# Patient Record
Sex: FEMALE | Race: AMERICAN INDIAN OR ALASKA NATIVE
[De-identification: names, ages, dates, MRNs, and addresses within clinical notes are randomized per-mention and may not be internally consistent; named-entity substitution may affect disease eponyms.]

---

## 2017-06-12 ENCOUNTER — HOSPITAL ENCOUNTER (EMERGENCY)
Dept: HOSPITAL 5 - ED | Age: 32
Discharge: HOME | End: 2017-06-12
Payer: MEDICAID

## 2017-06-12 VITALS — SYSTOLIC BLOOD PRESSURE: 122 MMHG | DIASTOLIC BLOOD PRESSURE: 52 MMHG

## 2017-06-12 DIAGNOSIS — Z3A.01: ICD-10-CM

## 2017-06-12 DIAGNOSIS — O23.41: Primary | ICD-10-CM

## 2017-06-12 LAB
ALBUMIN SERPL-MCNC: 4 G/DL (ref 3.9–5)
ALBUMIN/GLOB SERPL: 1.2 %
ALP SERPL-CCNC: 35 UNITS/L (ref 35–129)
ALT SERPL-CCNC: 9 UNITS/L (ref 7–56)
ANION GAP SERPL CALC-SCNC: 26 MMOL/L
BACTERIA #/AREA URNS HPF: (no result) /HPF
BASOPHILS NFR BLD AUTO: 0.9 % (ref 0–1.8)
BILIRUB SERPL-MCNC: 0.5 MG/DL (ref 0.1–1.2)
BILIRUB UR QL STRIP: (no result)
BLOOD UR QL VISUAL: (no result)
BUN SERPL-MCNC: 6 MG/DL (ref 7–17)
BUN/CREAT SERPL: 12 %
CALCIUM SERPL-MCNC: 9 MG/DL (ref 8.4–10.2)
CHLORIDE SERPL-SCNC: 97.8 MMOL/L (ref 98–107)
CK SERPL-CCNC: 47 UNITS/L (ref 30–135)
CO2 SERPL-SCNC: 17 MMOL/L (ref 22–30)
EOSINOPHIL NFR BLD AUTO: 0.2 % (ref 0–4.3)
GLUCOSE SERPL-MCNC: 113 MG/DL (ref 65–100)
HCT VFR BLD CALC: 39.7 % (ref 30.3–42.9)
HGB BLD-MCNC: 12.8 GM/DL (ref 10.1–14.3)
KETONES UR STRIP-MCNC: 20 MG/DL
LEUKOCYTE ESTERASE UR QL STRIP: (no result)
LIPASE SERPL-CCNC: 19 UNITS/L (ref 13–60)
MAGNESIUM SERPL-MCNC: 2.2 MG/DL (ref 1.7–2.3)
MCH RBC QN AUTO: 27 PG (ref 28–32)
MCHC RBC AUTO-ENTMCNC: 32 % (ref 30–34)
MCV RBC AUTO: 83 FL (ref 79–97)
NITRITE UR QL STRIP: (no result)
PH UR STRIP: 8 [PH] (ref 5–7)
PLATELET # BLD: 242 K/MM3 (ref 140–440)
POTASSIUM SERPL-SCNC: 3.2 MMOL/L (ref 3.6–5)
PROT SERPL-MCNC: 7.3 G/DL (ref 6.3–8.2)
PROT UR STRIP-MCNC: (no result) MG/DL
RBC # BLD AUTO: 4.79 M/MM3 (ref 3.65–5.03)
RBC #/AREA URNS HPF: 16 /HPF (ref 0–6)
SODIUM SERPL-SCNC: 138 MMOL/L (ref 137–145)
URINE DRUGS OF ABUSE NOTE: (no result)
UROBILINOGEN UR-MCNC: < 2 MG/DL (ref ?–2)
WBC # BLD AUTO: 9.6 K/MM3 (ref 4.5–11)
WBC #/AREA URNS HPF: 150 /HPF (ref 0–6)

## 2017-06-12 PROCEDURE — 82550 ASSAY OF CK (CPK): CPT

## 2017-06-12 PROCEDURE — 86900 BLOOD TYPING SEROLOGIC ABO: CPT

## 2017-06-12 PROCEDURE — 86901 BLOOD TYPING SEROLOGIC RH(D): CPT

## 2017-06-12 PROCEDURE — 84702 CHORIONIC GONADOTROPIN TEST: CPT

## 2017-06-12 PROCEDURE — 96374 THER/PROPH/DIAG INJ IV PUSH: CPT

## 2017-06-12 PROCEDURE — 96361 HYDRATE IV INFUSION ADD-ON: CPT

## 2017-06-12 PROCEDURE — 36415 COLL VENOUS BLD VENIPUNCTURE: CPT

## 2017-06-12 PROCEDURE — G0480 DRUG TEST DEF 1-7 CLASSES: HCPCS

## 2017-06-12 PROCEDURE — 76817 TRANSVAGINAL US OBSTETRIC: CPT

## 2017-06-12 PROCEDURE — 80053 COMPREHEN METABOLIC PANEL: CPT

## 2017-06-12 PROCEDURE — 86850 RBC ANTIBODY SCREEN: CPT

## 2017-06-12 PROCEDURE — 81001 URINALYSIS AUTO W/SCOPE: CPT

## 2017-06-12 PROCEDURE — 80307 DRUG TEST PRSMV CHEM ANLYZR: CPT

## 2017-06-12 PROCEDURE — 99284 EMERGENCY DEPT VISIT MOD MDM: CPT

## 2017-06-12 PROCEDURE — 76801 OB US < 14 WKS SINGLE FETUS: CPT

## 2017-06-12 PROCEDURE — 83735 ASSAY OF MAGNESIUM: CPT

## 2017-06-12 PROCEDURE — 85025 COMPLETE CBC W/AUTO DIFF WBC: CPT

## 2017-06-12 PROCEDURE — 93005 ELECTROCARDIOGRAM TRACING: CPT

## 2017-06-12 PROCEDURE — 83690 ASSAY OF LIPASE: CPT

## 2017-06-12 PROCEDURE — 80320 DRUG SCREEN QUANTALCOHOLS: CPT

## 2017-06-12 PROCEDURE — 96375 TX/PRO/DX INJ NEW DRUG ADDON: CPT

## 2017-06-12 PROCEDURE — 93010 ELECTROCARDIOGRAM REPORT: CPT

## 2017-06-12 RX ADMIN — POTASSIUM CHLORIDE SCH MLS/HR: 10 INJECTION, SOLUTION INTRAVENOUS at 20:20

## 2017-06-12 RX ADMIN — POTASSIUM CHLORIDE SCH: 10 INJECTION, SOLUTION INTRAVENOUS at 21:56

## 2017-06-12 NOTE — EMERGENCY DEPARTMENT REPORT
ED General Adult HPI





- General


Chief complaint: Nausea/Vomiting/Diarrhea


Stated complaint: NAUSEA/VOMITING/SEIZURES


Time Seen by Provider: 06/12/17 16:10


Source: patient, RN notes reviewed, old records reviewed


Mode of arrival: Wheelchair


Limitations: No Limitations





- History of Present Illness


Initial comments: 


This is a 31-year-old female.  She is previously known to have a history of 

bipolar disorder and seizure disorder.





Patient indicated that she is 6 weeks pregnant, and has a history of seizures, 

and gestational diabetes.  Surgical history significant for right-sided 

salpingectomy.





As per verbal report from family to nursing staff, patient's seizures have been 

present since childhood, and that they are "stress induced."








The patient presents to the Hospital today complaining of nausea and vomiting.  

When I speak to the patient, she answers very softly, and only gives 1 or 2 

word answers.  The patient indicates that she is not having homicidal and 

suicidal thoughts, but she is not having abdominal pain.  However, the history 

is very limited, because the patient really does not answer open-ended 

questions.  She does follow some commands.





Nursing staff indicates that when they elevate the patient's upper extremity, 

the patient stops before it hits the stretcher.  The patient is unable to 

describe exacerbating or relieving factors.








Family is not currently available at this time for collateral information.








I attempted to contact the listed phone numbers to obtain collateral information

, and was informed that they were incorrect numbers.









































-: unknown


Radiation: other (per hpi)


Quality: other (per hpi)


Improves with: other (per hpi)


Worsens with: other (per hpi)


Associated Symptoms: other (per hpi)





- Related Data


 Previous Rx's











 Medication  Instructions  Recorded  Last Taken  Type


 


Doxylamine/Pyridoxine HCl 1 each PO QHS PRN #30 tablet.dr 06/12/17 Unknown Rx





[Diclegis Dr 10-10 mg Tablet]    


 


Francia Root [Francia] 250 mg PO QID PRN #30 capsule 06/12/17 Unknown Rx


 


Nitrofurantoin Mono/M-Cryst 100 mg PO Q12HR #13 capsule 06/12/17 Unknown Rx





[Macrobid CAP]    


 


Ondansetron [Zofran Odt] 4 mg PO QID PRN #20 tab.rapdis 06/12/17 Unknown Rx


 


Potassium Chloride [K-Dur] 20 meq PO BID #14 tab 06/12/17 Unknown Rx


 


Prenatal Vit W-Ca,Fe,FA(<1 mg) 1 each PO QDAY #30 tablet 06/12/17 Unknown Rx





[Prenatal Vitamins]    











 Allergies











Allergy/AdvReac Type Severity Reaction Status Date / Time


 


Penicillins Allergy Severe SOB. Verified 02/26/15 18:32





   THROAT  





   SWELLS  














ED Review of Systems


ROS: 


Stated complaint: NAUSEA/VOMITING/SEIZURES


Other details as noted in HPI





Constitutional: malaise.  denies: fever


Eyes: denies: vision change


ENT: denies: epistaxis


Respiratory: denies: cough


Cardiovascular: denies: chest pain


Gastrointestinal: nausea, vomiting


Genitourinary: as per HPI.  denies: dysuria


Musculoskeletal: myalgia


Skin: denies: lesions


Neurological: weakness


Psychiatric: denies: homicidal thoughts, suicidal thoughts





ED Past Medical Hx





- Past Medical History


Previous Medical History?: Yes


Hx Congestive Heart Failure: No


Hx Diabetes: Yes (GDM with last pregnancy)


Hx Seizures: Yes (since childhood. None over last year. stress induced.)


Hx Psychiatric Treatment: Yes (BIPOLAR)


Hx Asthma: No


Hx COPD: No





- Surgical History


Past Surgical History?: No





- Social History


Smoking Status: Never Smoker


Substance Use Type: None





- Medications


Home Medications: 


 Home Medications











 Medication  Instructions  Recorded  Confirmed  Last Taken  Type


 


Doxylamine/Pyridoxine HCl 1 each PO QHS PRN #30 tablet. 06/12/17  Unknown Rx





[Diclegis Dr 10-10 mg Tablet]     


 


Francia Root [Francia] 250 mg PO QID PRN #30 capsule 06/12/17  Unknown Rx


 


Nitrofurantoin Mono/M-Cryst 100 mg PO Q12HR #13 capsule 06/12/17  Unknown Rx





[Macrobid CAP]     


 


Ondansetron [Zofran Odt] 4 mg PO QID PRN #20 tab.rapdis 06/12/17  Unknown Rx


 


Potassium Chloride [K-Dur] 20 meq PO BID #14 tab 06/12/17  Unknown Rx


 


Prenatal Vit W-Ca,Fe,FA(<1 mg) 1 each PO QDAY #30 tablet 06/12/17  Unknown Rx





[Prenatal Vitamins]     














ED Physical Exam





- General


Limitations: Physical Limitation


General appearance: anxious, other (patient is awake.  She answers some "yes/no

" questions.)





- Head


Head exam: Present: atraumatic, normocephalic





- Eye


Eye exam: Present: normal appearance, EOMI





- ENT


ENT exam: Present: normal exam, normal orophraynx, mucous membranes dry





- Neck


Neck exam: Present: normal inspection, full ROM.  Absent: tenderness, 

meningismus





- Respiratory


Respiratory exam: Present: normal lung sounds bilaterally.  Absent: respiratory 

distress, wheezes, rales, rhonchi, stridor, chest wall tenderness, accessory 

muscle use, decreased breath sounds, prolonged expiratory





- Cardiovascular


Cardiovascular Exam: Present: tachycardia, normal heart sounds.  Absent: 

systolic murmur, diastolic murmur, rubs, gallop





- GI/Abdominal


GI/Abdominal exam: Present: soft, normal bowel sounds.  Absent: distended, 

tenderness, guarding, rebound, rigid, pulsatile mass





- Extremities Exam


Extremities exam: Present: normal inspection, full ROM, normal capillary 

refill.  Absent: tenderness, pedal edema, joint swelling, calf tenderness





- Back Exam


Back exam: Present: normal inspection, full ROM.  Absent: tenderness, CVA 

tenderness (R), CVA tenderness (L), muscle spasm, paraspinal tenderness, 

vertebral tenderness





- Neurological Exam


Neurological exam: Present: alert (there is no facial droop.  The tongue is 

midline.  Extraocular movements are intact.), other (patient moves 4 

extremities to command, but quite weak week.  She indicates that she can 

perceive sensation in 4 extremities.)





- Psychiatric


Psychiatric exam: Absent: homicidal ideation, suicidal ideation





- Skin


Skin exam: Present: warm, dry, intact, normal color.  Absent: rash





ED Course


 Vital Signs











  06/12/17 06/12/17 06/12/17





  16:11 17:05 17:10


 


Temperature 97.8 F  


 


Pulse Rate 103 H  


 


Respiratory 16  





Rate   


 


Blood Pressure 123/79  111/66


 


Blood Pressure   





[Left]   


 


O2 Sat by Pulse 100 100 100





Oximetry   














  06/12/17 06/12/17 06/12/17





  17:20 17:30 17:40


 


Temperature   


 


Pulse Rate   


 


Respiratory   





Rate   


 


Blood Pressure 111/60 117/67 117/67


 


Blood Pressure   





[Left]   


 


O2 Sat by Pulse 100 100 100





Oximetry   














  06/12/17 06/12/17 06/12/17





  17:50 18:00 18:10


 


Temperature   


 


Pulse Rate   


 


Respiratory   





Rate   


 


Blood Pressure 115/82 112/52 112/52


 


Blood Pressure   





[Left]   


 


O2 Sat by Pulse 85 100 99





Oximetry   














  06/12/17 06/12/17 06/12/17





  18:15 18:20 18:30


 


Temperature 98.4 F  


 


Pulse Rate 72  


 


Respiratory 16  





Rate   


 


Blood Pressure  106/63 105/63


 


Blood Pressure 105/63  





[Left]   


 


O2 Sat by Pulse 100 100 





Oximetry   














  06/12/17 06/12/17 06/12/17





  19:05 19:10 19:58


 


Temperature   


 


Pulse Rate   


 


Respiratory 16  





Rate   


 


Blood Pressure  98/64 99/60


 


Blood Pressure   





[Left]   


 


O2 Sat by Pulse 100 100 100





Oximetry   














  06/12/17 06/12/17





  20:00 21:57


 


Temperature  98.7 F


 


Pulse Rate  92 H


 


Respiratory  18





Rate  


 


Blood Pressure 103/66 


 


Blood Pressure  122/52





[Left]  


 


O2 Sat by Pulse 99 99





Oximetry  














- Reevaluation(s)


Reevaluation #1: 





06/12/17 17:24 differential diagnosis: Conversion disorder, bipolar disorder, 

urinary tract infection, dehydration, hyperemesis, nausea and vomiting of 

pregnancy, partial seizure, psychogenic nonepileptic form seizure














Assessment and plan: 31-year-old female with a presenting complaint of nausea 

and vomiting.  When I speak to the patient, she indicates that she feels weak 

and that her arms and legs are hurting her.  There appears to be a psychiatric 

component to this presentation.  At points during her interview, she does 

follow her eyes up, but she remains awake the entire time.  We will check basic 

laboratory studies, obtain EKG, an obstetrics ultrasound.  Her neurologic exam 

is nonfocal, I don't believe she requires advanced imaging of her brain at this 

time.

















Reevaluation #2: 





06/12/17 18:17 patient much more awake and conversant.  She indicates she is 

not taking any medications at this time.  She indicates her 's phone 

number is the following; [phone].  Laboratory studies pending.  Urinalysis 

pending.  Ultrasound pending.











Reevaluation #3: 





06/12/17 20:03 no active vomiting noted.  Patient is able to walk without 

difficulty.  Appears quite comfortable.  Currently waiting laboratory studies, 

urinalysis, ultrasound.











Reevaluation #4: 





06/12/17 21:09 patient is reassessed.  Urinalysis is suggestive of urinary 

tract infection.  Ultrasound suggests intrauterine pregnancy.





Patient is tolerating liquid feeds.  I have reassessed this patient multiple 

times.  Her mental status and affect  had improved dramatically since she's 

been here.  The patient is instructed to discontinue cannabis consumption if 

she is doing so.  She will be discharged with nausea medication, prenatal 

vitamins, Macrobid, potassium supplementation.  Return precautions are 

reviewed.  The patient does admit to consuming marijuana, she indicates that 

she last consumed one week ago.








The patient was counseled that consumption of marijuana could have adverse 

effects on herself and on her pregnancy.

















06/12/17 21:16








ED Medical Decision Making





- Lab Data


Result diagrams: 


 06/12/17 16:36





 06/12/17 16:36








 Vital Signs











  06/12/17





  16:11


 


Temperature 97.8 F


 


Pulse Rate 103 H


 


Respiratory 16





Rate 


 


Blood Pressure 123/79


 


O2 Sat by Pulse 100





Oximetry 














 Lab Results











  06/12/17 06/12/17 06/12/17 Range/Units





  16:36 16:36 16:36 


 


WBC  9.6    (4.5-11.0)  K/mm3


 


RBC  4.79    (3.65-5.03)  M/mm3


 


Hgb  12.8    (10.1-14.3)  gm/dl


 


Hct  39.7    (30.3-42.9)  %


 


MCV  83    (79-97)  fl


 


MCH  27 L    (28-32)  pg


 


MCHC  32    (30-34)  %


 


RDW  13.7    (13.2-15.2)  %


 


Plt Count  242    (140-440)  K/mm3


 


Lymph % (Auto)  17.8    (13.4-35.0)  %


 


Mono % (Auto)  3.8    (0.0-7.3)  %


 


Eos % (Auto)  0.2    (0.0-4.3)  %


 


Baso % (Auto)  0.9    (0.0-1.8)  %


 


Lymph #  1.7    (1.2-5.4)  K/mm3


 


Mono #  0.4    (0.0-0.8)  K/mm3


 


Eos #  0.0    (0.0-0.4)  K/mm3


 


Baso #  0.1    (0.0-0.1)  K/mm3


 


Seg Neutrophils %  77.3 H    (40.0-70.0)  %


 


Seg Neutrophils #  7.4    (1.8-7.7)  K/mm3


 


Sodium   138   (137-145)  mmol/L


 


Potassium   3.2 L   (3.6-5.0)  mmol/L


 


Chloride   97.8 L   ()  mmol/L


 


Carbon Dioxide   17 L   (22-30)  mmol/L


 


Anion Gap   26   mmol/L


 


BUN   6 L   (7-17)  mg/dL


 


Creatinine   0.5 L   (0.7-1.2)  mg/dL


 


Estimated GFR   > 60   ml/min


 


BUN/Creatinine Ratio   12.00   %


 


Glucose   113 H   ()  mg/dL


 


Calcium   9.0   (8.4-10.2)  mg/dL


 


Total Bilirubin   0.50   (0.1-1.2)  mg/dL


 


AST   13   (5-40)  units/L


 


ALT   9   (7-56)  units/L


 


Alkaline Phosphatase   35   ()  units/L


 


Total Protein   7.3   (6.3-8.2)  g/dL


 


Albumin   4.0   (3.9-5)  g/dL


 


Albumin/Globulin Ratio   1.2   %


 


Lipase   19   (13-60)  units/L


 


HCG, Quant    10005 H  (0-4)  mIU/mL


 


Blood Type     


 


Antibody Screen     














  06/12/17 Range/Units





  17:40 


 


WBC   (4.5-11.0)  K/mm3


 


RBC   (3.65-5.03)  M/mm3


 


Hgb   (10.1-14.3)  gm/dl


 


Hct   (30.3-42.9)  %


 


MCV   (79-97)  fl


 


MCH   (28-32)  pg


 


MCHC   (30-34)  %


 


RDW   (13.2-15.2)  %


 


Plt Count   (140-440)  K/mm3


 


Lymph % (Auto)   (13.4-35.0)  %


 


Mono % (Auto)   (0.0-7.3)  %


 


Eos % (Auto)   (0.0-4.3)  %


 


Baso % (Auto)   (0.0-1.8)  %


 


Lymph #   (1.2-5.4)  K/mm3


 


Mono #   (0.0-0.8)  K/mm3


 


Eos #   (0.0-0.4)  K/mm3


 


Baso #   (0.0-0.1)  K/mm3


 


Seg Neutrophils %   (40.0-70.0)  %


 


Seg Neutrophils #   (1.8-7.7)  K/mm3


 


Sodium   (137-145)  mmol/L


 


Potassium   (3.6-5.0)  mmol/L


 


Chloride   ()  mmol/L


 


Carbon Dioxide   (22-30)  mmol/L


 


Anion Gap   mmol/L


 


BUN   (7-17)  mg/dL


 


Creatinine   (0.7-1.2)  mg/dL


 


Estimated GFR   ml/min


 


BUN/Creatinine Ratio   %


 


Glucose   ()  mg/dL


 


Calcium   (8.4-10.2)  mg/dL


 


Total Bilirubin   (0.1-1.2)  mg/dL


 


AST   (5-40)  units/L


 


ALT   (7-56)  units/L


 


Alkaline Phosphatase   ()  units/L


 


Total Protein   (6.3-8.2)  g/dL


 


Albumin   (3.9-5)  g/dL


 


Albumin/Globulin Ratio   %


 


Lipase   (13-60)  units/L


 


HCG, Quant   (0-4)  mIU/mL


 


Blood Type  O POSITIVE  


 


Antibody Screen  TNR  

















- EKG Data


-: EKG Interpreted by Me


EKG shows normal: sinus rhythm


Rate: normal, tachycardia, bradycardia





- EKG Data


When compared to previous EKG there are: previous EKG unavailable





06/12/17 18:18 normal sinus, 75 bpm, normal intervals, normal axis, motion 

artifact, incomplete right bundle branch block, not morphologically consistent 

with STEMI




















- Radiology Data


Radiology results: report reviewed, image reviewed


Ultrasound demonstrated single live intrauterine gestation at 6 weeks and 5 

days.  No obvious bleeding is noted.  Fetal cardiac activity is noted.

















Critical care attestation.: 


If time is entered above; I have spent that time in minutes in the direct care 

of this critically ill patient, excluding procedure time.








ED Disposition


Clinical Impression: 


 UTI (urinary tract infection), Nausea and vomiting during pregnancy





Disposition: DC-01 TO HOME OR SELFCARE


Is pt being admited?: No


Does the pt Need Aspirin: No


Condition: Stable


Instructions:  Hyperemesis Gravidarum (ED)


Additional Instructions: 


Take the medications as directed.  Follow up with an OB/GYN doctor within the 

next 7-10 days.  Discontinue marijuana consumption if you are consuming this 

product.  This is not healthy for yourself for further pregnancy.  Diet should 

be advanced as tolerated.  Return to the ER right away with fevers, chills, 

intractable nausea or vomiting, change in mental status, confusion, inability 

to tolerate liquid feeds.


Prescriptions: 


Doxylamine/Pyridoxine HCl [Diclegis Dr 10-10 mg Tablet] 1 each PO QHS PRN #30 

tablet.


 PRN Reason: Nausea


Francia Root [Francia] 250 mg PO QID PRN #30 capsule


 PRN Reason: Nausea


Nitrofurantoin Mono/M-Cryst [Macrobid CAP] 100 mg PO Q12HR #13 capsule


Ondansetron [Zofran Odt] 4 mg PO QID PRN #20 tab.rapdis


 PRN Reason: Nausea


Potassium Chloride [K-Dur] 20 meq PO BID #14 tab


Prenatal Vit W-Ca,Fe,FA(<1 mg) [Prenatal Vitamins] 1 each PO QDAY #30 tablet


Referrals: 


[facility],MD [Primary Care Provider] - 3-5 Days


[facility] [Provider Group] - 3-5 Days

## 2017-06-12 NOTE — EMERGENCY DEPARTMENT REPORT
Chief Complaint: Nausea/Vomiting/Diarrhea


Stated Complaint: NAUSEA/VOMITING/SEIZURES


Time Seen by Provider: 06/12/17 16:10





- HPI


History of Present Illness: 





PT is 6 weeks pregnant.  PT has been vomiting x 1 week.  PT has seen ob/gyn for 

this pregnancy.  PT has a hx of epilepsy.  PT states sz medication "don't work"

  





- ROS


Review of Systems: 





- sz activity


+ dark urine


- dysuria 


+ n/v 





- Exam


Physical Exam: 





pt appears nauseated


PT alert and appropriate.  no sz activity noted.  


MSE screening note: 


Focused history and physical exam performed.


Due to findings the following was ordered:





labs, fluids 





ED Disposition for MSE


Condition: Stable

## 2017-06-12 NOTE — ULTRASOUND REPORT
FINAL REPORT



EXAM:  US OB TRANSVAGINAL



HISTORY:  pregnant n/v 



TECHNIQUE:  Ultrasound obstetrical transabdominal 



PRIORS:  None.



FINDINGS:  

Uterus measures 9.2 x 6.0 x 7.4 centimeters 



There is gestational sac present within the uterus 



Fetal pole identified in with crown-rump length and 0.8

centimeters corresponding to an estimated gestational age of 6

weeks 5 days. Estimated date of delivery based on ultrasound is

January 31, 2018 fetal cardiac activity is present with heart

rate of 118 beats per minute 



The right ovary is 3.2 x 2.6 x 3.6 centimeters. There is a 2.6

centimeter cyst noted within the right ovary most likely corpus

luteum 



Left ovary is 3.3 x 1.7 x 1.8 centimeters. 



No free fluid identified 



IMPRESSION:  

Single live intrauterine gestation estimated at 6 weeks 5 days

## 2017-06-12 NOTE — ULTRASOUND REPORT
FINAL REPORT



EXAM:  US OB \T\lt; = 14 WEEKS FETUS



HISTORY:  pregnant n/v 



TECHNIQUE:  Ultrasound obstetrical transabdominal 



PRIORS:  None.



FINDINGS:  

Gestational sac present within the uterus. There is fetal pole

crown-rump length of 0.8 centimeters. Fetal cardiac activity is

present with heart rate of 120 beats per minute no free fluid

identified within the cul-de-sac. No myometrial abnormality seen.





IMPRESSION:  

Single live intrauterine gestation 



Please see report of today's transvaginal exam for further

findings

## 2017-06-22 ENCOUNTER — HOSPITAL ENCOUNTER (EMERGENCY)
Dept: HOSPITAL 5 - ED | Age: 32
Discharge: HOME | End: 2017-06-22
Payer: MEDICAID

## 2017-06-22 VITALS — SYSTOLIC BLOOD PRESSURE: 115 MMHG | DIASTOLIC BLOOD PRESSURE: 82 MMHG

## 2017-06-22 DIAGNOSIS — O26.891: ICD-10-CM

## 2017-06-22 DIAGNOSIS — F31.9: ICD-10-CM

## 2017-06-22 DIAGNOSIS — Z86.32: ICD-10-CM

## 2017-06-22 DIAGNOSIS — Z88.0: ICD-10-CM

## 2017-06-22 DIAGNOSIS — O21.9: Primary | ICD-10-CM

## 2017-06-22 DIAGNOSIS — R11.0: ICD-10-CM

## 2017-06-22 DIAGNOSIS — R56.9: ICD-10-CM

## 2017-06-22 DIAGNOSIS — Z3A.08: ICD-10-CM

## 2017-06-22 LAB
ANION GAP SERPL CALC-SCNC: 21 MMOL/L
BASOPHILS NFR BLD AUTO: 0.4 % (ref 0–1.8)
BILIRUB UR QL STRIP: (no result)
BLOOD UR QL VISUAL: (no result)
BUN SERPL-MCNC: 5 MG/DL (ref 7–17)
BUN/CREAT SERPL: 8.33 %
CALCIUM SERPL-MCNC: 9.3 MG/DL (ref 8.4–10.2)
CHLORIDE SERPL-SCNC: 100.1 MMOL/L (ref 98–107)
CO2 SERPL-SCNC: 19 MMOL/L (ref 22–30)
EOSINOPHIL NFR BLD AUTO: 1 % (ref 0–4.3)
GLUCOSE SERPL-MCNC: 87 MG/DL (ref 65–100)
HCT VFR BLD CALC: 37.9 % (ref 30.3–42.9)
HGB BLD-MCNC: 12.2 GM/DL (ref 10.1–14.3)
KETONES UR STRIP-MCNC: (no result) MG/DL
LEUKOCYTE ESTERASE UR QL STRIP: (no result)
MCH RBC QN AUTO: 27 PG (ref 28–32)
MCHC RBC AUTO-ENTMCNC: 32 % (ref 30–34)
MCV RBC AUTO: 82 FL (ref 79–97)
MUCOUS THREADS #/AREA URNS HPF: (no result) /HPF
NITRITE UR QL STRIP: (no result)
PH UR STRIP: 7 [PH] (ref 5–7)
PLATELET # BLD: 224 K/MM3 (ref 140–440)
POTASSIUM SERPL-SCNC: 3.5 MMOL/L (ref 3.6–5)
PROT UR STRIP-MCNC: (no result) MG/DL
RBC # BLD AUTO: 4.6 M/MM3 (ref 3.65–5.03)
RBC #/AREA URNS HPF: 3 /HPF (ref 0–6)
SODIUM SERPL-SCNC: 137 MMOL/L (ref 137–145)
UROBILINOGEN UR-MCNC: < 2 MG/DL (ref ?–2)
WBC # BLD AUTO: 13.6 K/MM3 (ref 4.5–11)
WBC #/AREA URNS HPF: 10 /HPF (ref 0–6)

## 2017-06-22 PROCEDURE — 96360 HYDRATION IV INFUSION INIT: CPT

## 2017-06-22 PROCEDURE — 99283 EMERGENCY DEPT VISIT LOW MDM: CPT

## 2017-06-22 PROCEDURE — 81001 URINALYSIS AUTO W/SCOPE: CPT

## 2017-06-22 PROCEDURE — 82962 GLUCOSE BLOOD TEST: CPT

## 2017-06-22 PROCEDURE — 85025 COMPLETE CBC W/AUTO DIFF WBC: CPT

## 2017-06-22 PROCEDURE — 80048 BASIC METABOLIC PNL TOTAL CA: CPT

## 2017-06-22 PROCEDURE — 36415 COLL VENOUS BLD VENIPUNCTURE: CPT

## 2017-06-22 NOTE — EMERGENCY DEPARTMENT REPORT
ED N/V/D HPI





- General


Chief complaint: Nausea/Vomiting/Diarrhea


Stated complaint: 8WKS PREG/NAUSEA/VOMITING


Time Seen by Provider: 17 20:20


Source: family


Mode of arrival: Ambulatory


Limitations: Other





- History of Present Illness


Initial comments: 





31-year-old female presents to the emergency department complaining of nausea 

and vomiting.  Patient states that she is currently approximately 8 weeks 

pregnant, .  She states that she has been having nausea and vomiting for 

several weeks with this pregnancy.  She states she is having difficulty keeping 

solids and liquids down.  She denies vaginal bleeding, vaginal discharge, or 

abdominal pain.  Patient was seen by her OB/GYN today and sent to the emergency 

department.  Patient states she has been trying Zofran without relief.  She was 

given samples of Diclegis by her OB, and states that she was able to keep down 

some liquids.  There are no other complaints.


MD complaint: nausea, vomiting


-: Gradual, week(s)


Description of Vomiting: food contents, bilious


Associated Abdominal Pain: No


Consistency: intermittent


Improves with: none


Worsens with: none


Associated Symptoms: denies other symptoms





- Related Data


 Previous Rx's











 Medication  Instructions  Recorded  Last Taken  Type


 


Doxylamine/Pyridoxine HCl 1 each PO QHS PRN #30 tablet. 17 Unknown Rx





[Diclegis Dr 10-10 mg Tablet]    


 


Francia Root [Francia] 250 mg PO QID PRN #30 capsule 17 Unknown Rx


 


Nitrofurantoin Mono/M-Cryst 100 mg PO Q12HR #13 capsule 17 Unknown Rx





[Macrobid CAP]    


 


Ondansetron [Zofran Odt] 4 mg PO QID PRN #20 tab.rapdis 17 Unknown Rx


 


Potassium Chloride [K-Dur] 20 meq PO BID #14 tab 17 Unknown Rx


 


Prenatal Vit W-Ca,Fe,FA(<1 mg) 1 each PO QDAY #30 tablet 17 Unknown Rx





[Prenatal Vitamins]    











 Allergies











Allergy/AdvReac Type Severity Reaction Status Date / Time


 


Penicillins Allergy Severe SOB. Verified 02/26/15 18:32





   THROAT  





   SWELLS  














ED Review of Systems


ROS: 


Stated complaint: 8WKS PREG/NAUSEA/VOMITING


Other details as noted in HPI





Comment: All other systems reviewed and negative


Gastrointestinal: nausea, vomiting





ED Past Medical Hx





- Past Medical History


Previous Medical History?: Yes


Hx Congestive Heart Failure: No


Hx Diabetes: Yes (GDM with last pregnancy)


Hx Seizures: Yes (stress induced)


Hx Psychiatric Treatment: Yes (BIPOLAR)


Hx Asthma: No


Hx COPD: No





- Surgical History


Past Surgical History?: Yes


Additional Surgical History: Right salpingectomy for ectopic pregnancy





- Family History


Family history: no significant





- Social History


Smoking Status: Unknown if ever smoked


Substance Use Type: Other





- Medications


Home Medications: 


 Home Medications











 Medication  Instructions  Recorded  Confirmed  Last Taken  Type


 


Doxylamine/Pyridoxine HCl 1 each PO QHS PRN #30 tablet.dr 17  Unknown Rx





[Diclegis Dr 10-10 mg Tablet]     


 


Francia Root [Francia] 250 mg PO QID PRN #30 capsule 17  Unknown Rx


 


Nitrofurantoin Mono/M-Cryst 100 mg PO Q12HR #13 capsule 17  Unknown Rx





[Macrobid CAP]     


 


Ondansetron [Zofran Odt] 4 mg PO QID PRN #20 tab.rapdis 17  Unknown Rx


 


Potassium Chloride [K-Dur] 20 meq PO BID #14 tab 17  Unknown Rx


 


Prenatal Vit W-Ca,Fe,FA(<1 mg) 1 each PO QDAY #30 tablet 17  Unknown Rx





[Prenatal Vitamins]     














ED Physical Exam





- General


Limitations: Other


General appearance: alert, in no apparent distress





- Head


Head exam: Present: atraumatic, normocephalic





- Eye


Eye exam: Present: normal appearance, PERRL, EOMI





- ENT


ENT exam: Present: normal exam, normal orophraynx, mucous membranes moist





- Neck


Neck exam: Present: normal inspection, full ROM.  Absent: tenderness





- Respiratory


Respiratory exam: Present: normal lung sounds bilaterally.  Absent: respiratory 

distress





- Cardiovascular


Cardiovascular Exam: Present: regular rate, normal rhythm, normal heart sounds





- GI/Abdominal


GI/Abdominal exam: Present: soft, normal bowel sounds.  Absent: distended, 

tenderness





- Extremities Exam


Extremities exam: Present: normal inspection, full ROM.  Absent: tenderness





- Back Exam


Back exam: Present: normal inspection, full ROM.  Absent: tenderness





- Neurological Exam


Neurological exam: Present: alert, oriented X3.  Absent: motor sensory deficit





- Skin


Skin exam: Present: warm, dry, intact





ED Course


 Vital Signs











  06/17





  16:43 18:37 20:56


 


Temperature 97.9 F 98.1 F 99.2 F


 


Pulse Rate 99 H 78 76


 


Respiratory 20 16 18





Rate   


 


Blood Pressure 116/71  


 


Blood Pressure   115/82





[Left]   


 


Blood Pressure  106/74 





[Right]   


 


O2 Sat by Pulse 100 100 100





Oximetry   














ED Medical Decision Making





- Lab Data


Result diagrams: 


 17 17:00





 17 17:00





- Medical Decision Making





Lab results reviewed and discussed with the patient.  I have spoken with Dr. Ruth, OB/GYN.  There is no indication for admission to the hospital for this 

patient at this time.  Patient will be discharged home to follow up with her OB/

GYN as an outpatient.





- Differential Diagnosis


hyperemesis gravidarum, dehydration, electrolyte abnormality


Critical care attestation.: 


If time is entered above; I have spent that time in minutes in the direct care 

of this critically ill patient, excluding procedure time.








ED Disposition


Clinical Impression: 


 Nausea and vomiting during pregnancy





Disposition: DC-01 TO HOME OR SELFCARE


Is pt being admited?: No


Condition: Stable


Instructions:  Acute Nausea and Vomiting (ED)


Referrals: 


JACQUE ARANA DO [Staff Physician] - 3-5 Days


Time of Disposition: 22:34

## 2018-01-18 ENCOUNTER — HOSPITAL ENCOUNTER (OUTPATIENT)
Dept: HOSPITAL 5 - TRG | Age: 33
Discharge: HOME | End: 2018-01-18
Attending: OBSTETRICS & GYNECOLOGY
Payer: MEDICAID

## 2018-01-18 VITALS — DIASTOLIC BLOOD PRESSURE: 84 MMHG | SYSTOLIC BLOOD PRESSURE: 126 MMHG

## 2018-01-18 DIAGNOSIS — O47.1: Primary | ICD-10-CM

## 2018-01-18 DIAGNOSIS — Z3A.38: ICD-10-CM

## 2019-03-18 NOTE — EMERGENCY DEPARTMENT REPORT
HPI





- General


Chief Complaint: Extremity Problem,Nontraumatic


Time Seen by Provider: 19 11:59





- HPI


HPI: 





This is a 33-year-old female  at about 32 weeks who presents to ED 

complaining of left leg swelling 2 days.  Patient states yesterday she noticed 

left leg was splinted within the right.  Patient states the symptoms resolved in

length leg swelling went down but she went to her OB/GYN today is 2 to come to 

the ER for evaluation..  She denies vaginal bleeding, vaginal discharge, 

headache, she is of breath,.  She reports good fetal movement


She received prenatal care at [facility]  in [place]





ED Past Medical Hx





- Past Medical History


Previous Medical History?: Yes


Hx Congestive Heart Failure: No


Hx Diabetes: Yes (GDM with last pregnancy)


Hx Deep Vein Thrombosis: No


Hx Renal Disease: No


Hx Sickle Cell Disease: No


Hx Seizures: Yes


Hx Psychiatric Treatment: Yes (BIPOLAR)


Hx Asthma: No


Hx COPD: No


Hx HIV: No





- Surgical History


Past Surgical History?: Yes


Additional Surgical History: Right salpingectomy for ectopic pregnancy





- Social History


Smoking Status: Never Smoker





- Medications


Home Medications: 


                                Home Medications











 Medication  Instructions  Recorded  Confirmed  Last Taken  Type


 


Doxylamine Succinate/Vit B6 1 each PO QHS PRN #30 tablet. 17 

Unknown Rx





[Diclegis Dr 10-10 mg Tablet]     


 


Ginger Root [Ginger] 250 mg PO QID PRN #30 capsule 17 Unknown Rx


 


Nitrofurantoin Mono/M-Cryst 100 mg PO Q12HR #13 capsule 17 

Unknown Rx





[Macrobid CAP]     


 


Ondansetron [Zofran Odt] 4 mg PO QID PRN #20 tab.rapdis 17 

Unknown Rx


 


Potassium Chloride [K-Dur] 20 meq PO BID #14 tab 17 Unknown Rx


 


Prenatal Vit Calc,Iron,Folic 1 each PO QDAY #30 tablet 17 08:00 Rx





[Prenatal Vitamins]     














ED Review of Systems


ROS: 


Stated complaint: ULTRASOUND ON LEG


Other details as noted in HPI





Comment: All other systems reviewed and negative





Physical Exam





- Physical Exam


Vital Signs: 


                                   Vital Signs











  19





  12:09


 


Temperature 97.8 F


 


Pulse Rate 96 H


 


Respiratory 18





Rate 


 


Blood Pressure 116/68


 


O2 Sat by Pulse 100





Oximetry 











Physical Exam: 





GENERAL: Alert and oriented x3, no apparent distress, Normal Gait, atraumatic.


HEAD: Head is normocephalic and a-traumatic.


LUNGS: Symetrical with respiration, No wheezing, no rales or crackles, CTAB.


HEART:  S1, S2 present, regular rate and rhythm without murmur, no rubs, no 

gallops. Non tender to palpation


ABDOMEN: Gravid uterus,Positive bowel sounds, soft, and non-distended.  

Nontender to palpation on all Quadrants, NO CVA tenderness.





EXTREMITIES/MUSCULOSKELETAL: No cyanosis, clubbing, rash, lesions or edema. Full

ROM bilaterally. UE/LE Pulses 2+ bilaterally.  LE and UE 5+ strength 

bilaterally.


NEUROLOGIC:  The patient is cooperative with no focal neurologic deficits. 

Cranial nerves II through XII are grossly intact. 





SKIN:  Warm and dry, No lesions, No ulceration or induration present.











ED Course


                                   Vital Signs











  19





  12:09


 


Temperature 97.8 F


 


Pulse Rate 96 H


 


Respiratory 18





Rate 


 


Blood Pressure 116/68


 


O2 Sat by Pulse 100





Oximetry 














ED Medical Decision Making





- Radiology Data


Radiology results: report reviewed, image reviewed








PROCEDURE: VL VENOUS DUPLEX LE LT 





TECHNIQUE: Ultrasound examination of the left lower extremity was performed to 

evaluate for DVT. 





HISTORY: left leg pain 





PRIORS: None 





FINDINGS: 





The common femoral, greater saphenous, femoral, and popliteal veins are well 

visualized and easily 


compressible throughout with good color flow. Augmentation is normal at multiple

levels from the 


popliteal to the common femoral vein levels. 





Examination of the calf veins demonstrate good visualization and compressibility

of the peroneal 


and posterior tibial veins with good color flowthrough out. 





IMPRESSION: 





No evidence for DVT identified in the left lower extremity. 





This document is electronically signed by Brittaney Boucher MD., 2019 

03:19:03 PM ET 





Transcribed By: [facility] 


Dictated By: BRITTANEY BOUCHER MD 


Electronically Authenticated By: BRITTANEY BOUCHER MD 


Signed Date/Time: 19 1521 





- Medical Decision Making





33-year-old female presents at 32 weeks.  Shows no signs of clots/dvt.


Discussed findings with the patient.


Discussed the patient acute ambulating as needed scan.


Discussed follow-up with OB/GYN within 2 weeks.


Vital signs are normal patient is in no acute distress.








Critical care attestation.: 


If time is entered above; I have spent that time in minutes in the direct care 

of this critically ill patient, excluding procedure time.








ED Disposition


Clinical Impression: 


 Leg edema, left





Disposition: DC-01 TO HOME OR SELFCARE


Is pt being admited?: No


Does the pt Need Aspirin: No


Condition: Stable


Instructions:  Leg Edema (ED)


Additional Instructions: 


Make sure to follow up with the OB/GYN as discussed.


He do not have a blood clot in her legs.


  Make sure you are not standing or sitting for prolonged time





If you have any worsening symptoms or develop new symptoms please return to ED 

immediately.


Referrals: 


[facility], MD [Primary Care Provider] - 3-5 Days


Forms:  Work/School Release Form(ED)


Time of Disposition: 15:40

## 2019-03-18 NOTE — VASCULAR LAB REPORT
PROCEDURE: VL VENOUS DUPLEX LE LT 

 

TECHNIQUE: Ultrasound examination of the left lower extremity was performed to evaluate for DVT.  

 

HISTORY: left leg pain 

 

PRIORS: None 

 

FINDINGS: 

 

The common femoral, greater saphenous, femoral, and popliteal veins are well visualized and easily co
mpressible throughout with good color flow.  Augmentation is normal at multiple levels from the popli
teal to the common femoral vein levels. 

 

Examination of the calf veins demonstrate good visualization and compressibility of the peroneal and 
posterior tibial veins with good color flowthrough out. 

 

IMPRESSION: 

 

No evidence for DVT identified in the left lower extremity. 

 

This document is electronically signed by Brittaney Boucher MD., March 18 2019 03:19:03 PM ET

## 2019-03-18 NOTE — EMERGENCY DEPARTMENT REPORT
Blank Doc





- Documentation


Documentation: 





This is a 33-year-old female that presents with left leg pain and swelling.  S

randy is about 32 pregnant.  Was seen by OBGYN this morning and was sent to the 

ED for doppler US to r/o DVT.





This initial assessment diagnostic orders/clinical plan/treatment(s) is/are 

subject to change based on patient's health status, clinical progression and re-

assessment by fellow clinical providers in the ED.  Further treatment and workup

at subsequent clinical providers discretion.  Patient/guardians urged not to 

elope from ED s their condition may be serious if not clinically assessed and 

managed.  Initial orders include:


1-patient sent to [facility] for further evaluation and treatment.


2- Doppler US

## 2019-04-24 NOTE — PROCEDURE NOTE
OB Delivery Note





- Delivery


Date of Delivery: 19 (181)


Surgeon: POLY SALGADO


Estimated blood loss: 200cc





- Vaginal


Delivery presentation: vertex


Delivery position: OA


Intrapartum events: none


Delivery induction: none


Delivery augmentation: pitocin


Delivery monitor: external FHT, external uterine


Route of delivery: 


Delivery placenta: spontaneous


Delivery cord: nuchal cord, 3 umbilical vessels


Episiotomy: none


Delivery laceration: none


Anesthesia: none


Delivery comments: 


 of a live 6'6 male infant over a intact perineum under IV pain control with

Apgars of 8 and 9 at 1817 on 2019.  Nuchal cord x 1 easily manually 

reduced on the perineum prior to delivery of the anterior shoulder.  Infant 

directly to maternal abd/chest, skin to skin contact.  Spontaneous delivery of 

the placenta complete and intact with Christensen side presenting at 1820.  Fundus 

is firm and midline located 5 below the U. Lochia is scant. Delayed cord 

clamping and cutting; Cord cut by the Father of the Baby.  GBS prophylaxis x 1. 

Cord blood collected; Placenta discarded. 








- Infant


  ** A


Apgar at 1 minute: 8


Apgar at 5 minutes: 9


Infant Gender: Male (6'6)
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
## 2019-04-25 NOTE — DISCHARGE SUMMARY
Providers





- Providers


Date of Admission: 


19 16:16





Date of discharge: 19


Attending physician: 


FRANKLIN ANTOINE MD





Primary care physician: 


FRANKLIN ANTOINE MD








Hospitalization


Reason for admission: induction of labor


Delivery: 


Procedure details: 





See delivery note


Episiotomy: none


Laceration: none


Other postpartum procedures: none


Postpartum complications: none


Discharge diagnosis: IUP at term delivered


 baby: male


Condition at discharge: Good


Disposition: DC-01 TO HOME OR SELFCARE





Plan





- Provider Discharge Summary


Activity: routine, no sex for 6 weeks, no heavy lifting 4 weeks, no strenuous 

exercise


Diet: routine


Instructions: routine


Additional instructions: 


[]  Smoking cessation referral if applicable(refer to patient education folder 

for contact #)


[]  Refer to [facility]'s [facility] Booklet








Call your doctor immediately for:


* Fever > 100.5


* Heavy vaginal bleeding ( >1 pad per hour)


* Severe persistent headache


* Shortness of breath


* Reddened, hot, painful area to leg or breast


* Drainage or odor from incision.





* Keep incision clean and dry at all times and follow doctor's instructions 

regarding bathing/showering





Asymptomatic anemia: Continue Ferrous sulfate 325mg PO BID





- Follow up plan


Follow up: 


FRANKLIN ANTOINE MD [Primary Care Provider] - 6 Weeks

## 2019-04-25 NOTE — PROGRESS NOTE
Assessment and Plan





A: PPD#1 s/p 


    Bottle feeding


    Asymptomatic anemia 


    Stable





P: Routine PP care


    Discharge home today pending Peds





Subjective





- Subjective


Date of service: 19


Principal diagnosis: PPD#1 s/p 


Interval history: 





See H&P and delivery note


Patient reports: appetite normal, voiding normally, pain well controlled, 

flatus, bowel movement, ambulating normally


: doing well, bottle feeding





Objective





- Vital Signs


Latest vital signs: 


                                   Vital Signs











  Temp Pulse Resp BP BP Pulse Ox


 


 19 08:17  98.3 F  98 H  18   112/73  100


 


 19 04:30  98.0 F  88  20   113/70  98


 


 19 22:00  97.8 F  85  20   110/71  100


 


 19 21:57  97.8 F  88  20  110/71   100


 


 19 20:07   97 H   124/59  


 


 19 19:52   91 H   103/53  


 


 19 19:37   95 H   106/57  


 


 19 19:22   95 H   107/51  


 


 19 19:07   100 H   100/54  


 


 19 18:52   100 H   113/57  


 


 19 18:37   94 H   111/55  


 


 19 18:22   105 H   127/67  


 


 19 18:17   62     58 L


 


 19 18:13   106 H     100


 


 19 18:08   110 H     100


 


 19 18:03   104 H     97


 


 19 17:58   108 H     97


 


 19 17:53   104 H     97


 


 19 17:48   117 H     94


 


 19 17:44   97 H     93


 


 19 17:43   90     99


 


 19 17:38   107 H     100


 


 19 17:35    20   


 


 19 17:33   107 H     99


 


 19 17:28   102 H     98


 


 19 17:23   117 H     99


 


 19 15:16  98.1 F   20   


 


 19 15:03   92 H   123/74  


 


 19 13:56   99 H   125/65  


 


 19 11:35   107 H   155/94  


 


 19 11:34  97.6 F   18   








                                Intake and Output











 19





 23:59 07:59 15:59


 


Intake Total 3.2  


 


Output Total 900 800 


 


Balance -896.8 -800 


 


Intake:   


 


  IV 3.2  


 


    PITOCin/NS 30 UNIT/500ML 3.2  





    30 units In 500 ml @ 4   





    mls/hr IV TITR ARASELI Rx#:   





    [id number]   


 


Output:   


 


  Urine 900 800 


 


    Void 900 800 


 


Other:   


 


  Total, Output Amount 900 800 


 


  Estimated Blood Loss 200  














- Exam


Breasts: Present: normal


Cardiovascular: Present: Regular rate, Normal S1, Normal S2, No murmurs


Lungs: Present: Clear to auscultation, Normal air movement


Abdomen: Present: normal appearance, soft, normal bowel sounds.  Absent: 

distention


Vulva: both: normal


Uterus: Present: firm, fundal height below umbilicus (-1)


Extremities: Present: normal


Deep Tendon Reflex Grade: Normal +2





- Labs


Labs: 


                              Abnormal lab results











  19 Range/Units





  Unknown 05:31 


 


WBC  12.2 H   (4.5-11.0)  K/mm3


 


Hgb   9.9 L  (10.1-14.3)  gm/dl


 


MCV  74 L   (79-97)  fl


 


MCH  23 L   (28-32)  pg


 


RDW  17.4 H   (13.2-15.2)  %

## 2021-08-15 NOTE — EMERGENCY DEPARTMENT REPORT
ED Seizure HPI





- General


Chief Complaint: Seizure


Stated Complaint: SEIZURE


Time Seen by Provider: 08/15/21 14:41


Source: EMS


Mode of arrival: Stretcher


Limitations: No Limitations





- History of Present Illness


Initial Comments: 





Patient is 36 years old female with history of seizure on Keppra, noncompliant 

with her medication.  Patient also has history of bipolar disorder.  Patient 

brought to the emergency room via EMS for evaluation of breakthrough seizure.  

Patient is noncompliant with her medication.  Patient received Versed by EMS.  

Patient had 1 episode of seizure witnessed by the charge nurse here.  She 

described her seizure as repetitively moving her head.


MD Complaint: seizure, feel seizure coming on


-: This morning


Description of Episode: loss of consciousness, tonic-clonic movement, post-event

confusion


Witnessed:: Yes


Trauma: No


Seizure History: known seizure disorder


Place: home


Possible Precipitating Event: none


Associated Symptoms: denies other symptoms


Treatments Prior to Arrival: benzodiazepines





- Related Data


                                  Previous Rx's











 Medication  Instructions  Recorded  Last Taken  Type


 


Doxylamine Succinate/Vit B6 1 each PO QHS PRN #30 tablet. 06/12/17 Unknown Rx





[Diclegis Dr 10-10 mg Tablet]    


 


Francia Root [Francia] 250 mg PO QID PRN #30 capsule 06/12/17 Unknown Rx


 


Nitrofurantoin Mono/M-Cryst 100 mg PO Q12HR #13 capsule 06/12/17 Unknown Rx





[Macrobid CAP]    


 


Ondansetron [Zofran Odt] 4 mg PO QID PRN #20 tab.rapdis 06/12/17 Unknown Rx


 


Potassium Chloride [K-Dur] 20 meq PO BID #14 tab 06/12/17 Unknown Rx


 


Prenatal Vit Calc,Iron,Folic 1 each PO QDAY #30 tablet 06/12/17 01/24/18 08:00 

Rx





[Prenatal Vitamins]    











                                    Allergies











Allergy/AdvReac Type Severity Reaction Status Date / Time


 


Penicillins Allergy Severe SOB. Verified 08/15/21 14:11





   THROAT  





   SWELLS  














ED Review of Systems


ROS: 


Stated complaint: SEIZURE


Other details as noted in HPI





Comment: Unobtainable due to pts medical conditions





ED Past Medical Hx





- Past Medical History


Hx Hypertension: No


Hx Congestive Heart Failure: No


Hx Diabetes: No


Hx Deep Vein Thrombosis: No


Hx Renal Disease: No


Hx Sickle Cell Disease: No


Hx Seizures: No


Hx Psychiatric Treatment: Yes (BIPOLAR)


Hx Asthma: No


Hx COPD: No


Hx HIV: No





- Surgical History


Additional Surgical History: Right salpingectomy for ectopic pregnancy





- Social History


Smoking Status: Former Smoker





- Medications


Home Medications: 


                                Home Medications











 Medication  Instructions  Recorded  Confirmed  Last Taken  Type


 


Doxylamine Succinate/Vit B6 1 each PO QHS PRN #30 tablet. 06/12/17 01/24/18 

Unknown Rx





[Diclegis Dr 10-10 mg Tablet]     


 


Francia Root [Francia] 250 mg PO QID PRN #30 capsule 06/12/17 01/24/18 Unknown Rx


 


Nitrofurantoin Mono/M-Cryst 100 mg PO Q12HR #13 capsule 06/12/17 01/24/18 

Unknown Rx





[Macrobid CAP]     


 


Ondansetron [Zofran Odt] 4 mg PO QID PRN #20 tab.rapdis 06/12/17 01/24/18 

Unknown Rx


 


Potassium Chloride [K-Dur] 20 meq PO BID #14 tab 06/12/17 01/24/18 Unknown Rx


 


Prenatal Vit Calc,Iron,Folic 1 each PO QDAY #30 tablet 06/12/17 01/24/18 01/24/18 08:00 Rx





[Prenatal Vitamins]     














ED Physical Exam





- General


Limitations: No Limitations


General appearance: in no apparent distress, postictal





- Head


Head exam: Present: atraumatic, normocephalic





- Eye


Eye exam: Present: normal appearance, PERRL





- ENT


ENT exam: Present: normal exam, normal orophraynx, mucous membranes moist





- Neck


Neck exam: Present: normal inspection, full ROM.  Absent: tenderness, 

meningismus





- Respiratory


Respiratory exam: Present: normal lung sounds bilaterally





- Cardiovascular


Cardiovascular Exam: Present: regular rate, normal rhythm, normal heart sounds





- GI/Abdominal


GI/Abdominal exam: Present: soft, normal bowel sounds.  Absent: distended, 

tenderness, guarding, rebound, rigid, organomegaly, mass, bruit, pulsatile mass,

hernia





- Extremities Exam


Extremities exam: Present: normal inspection, full ROM, normal capillary refill.

 Absent: pedal edema, calf tenderness





- Back Exam


Back exam: Present: normal inspection, full ROM.  Absent: CVA tenderness (R), 

CVA tenderness (L)





- Neurological Exam


Neurological exam: Present: alert





- Psychiatric


Psychiatric exam: Present: normal mood





- Skin


Skin exam: Present: warm, intact, normal color





ED Course


                                   Vital Signs











  08/15/21





  14:09


 


Temperature 98.2 F


 


Pulse Rate 100 H


 


Respiratory 20





Rate 


 


Blood Pressure 119/69





[Left] 














ED Medical Decision Making





- Lab Data


Result diagrams: 


                                 08/15/21 16:51





                                 08/15/21 16:51





- Medical Decision Making





Patient is 36 years old female with history of seizure on Keppra, noncompliant 

with her medication.  Patient also has history of bipolar disorder.  Patient b

rought to the emergency room via EMS for evaluation of breakthrough seizure.  

Patient is noncompliant with her medication.  Patient received Versed by EMS.  

Patient had 1 episode of seizure witnessed by the charge nurse here.  She 

described her seizure as repetitively moving her head.





Patient received Keppra 1 g IV.  Labs reviewed and is unremarkable.  No seizure 

activity observed in the ER.  Patient given prescription for Keppra and advised 

to follow-up with her neurologist in the next 2 to 3 days and to return to the 

ER if she develop any new symptoms.


Critical care attestation.: 


If time is entered above; I have spent that time in minutes in the direct care 

of this critically ill patient, excluding procedure time.








ED Disposition


Clinical Impression: 


 Seizure





Disposition: 01 HOME / SELF CARE / HOMELESS


Is pt being admited?: No


Condition: Stable


Instructions:  Seizure, Adult, Easy-to-Read


Referrals: 


PRIMARY CARE,MD [Primary Care Provider] - 3-5 Days


ANTWAN COSBY MD [Referring] - 3-5 Days